# Patient Record
Sex: MALE | Race: WHITE | NOT HISPANIC OR LATINO | Employment: OTHER | URBAN - METROPOLITAN AREA
[De-identification: names, ages, dates, MRNs, and addresses within clinical notes are randomized per-mention and may not be internally consistent; named-entity substitution may affect disease eponyms.]

---

## 2017-11-10 RX ORDER — DABIGATRAN ETEXILATE 150 MG/1
150 CAPSULE ORAL 2 TIMES DAILY
Qty: 36 CAPSULE | Refills: 0 | COMMUNITY
Start: 2017-11-10 | End: 2018-01-10 | Stop reason: ALTCHOICE

## 2018-01-10 ENCOUNTER — OFFICE VISIT (OUTPATIENT)
Dept: CARDIOLOGY | Facility: CLINIC | Age: 68
End: 2018-01-10

## 2018-01-10 VITALS
DIASTOLIC BLOOD PRESSURE: 78 MMHG | HEART RATE: 76 BPM | SYSTOLIC BLOOD PRESSURE: 118 MMHG | HEIGHT: 70 IN | WEIGHT: 285 LBS | BODY MASS INDEX: 40.8 KG/M2

## 2018-01-10 DIAGNOSIS — R79.1 ELEVATED INTERNATIONAL NORMALIZED RATIO (INR): ICD-10-CM

## 2018-01-10 DIAGNOSIS — E78.5 HYPERLIPIDEMIA, UNSPECIFIED HYPERLIPIDEMIA TYPE: ICD-10-CM

## 2018-01-10 DIAGNOSIS — I48.20 CHRONIC ATRIAL FIBRILLATION (HCC): Primary | ICD-10-CM

## 2018-01-10 DIAGNOSIS — Z79.01 ANTICOAGULATED: ICD-10-CM

## 2018-01-10 PROCEDURE — 93000 ELECTROCARDIOGRAM COMPLETE: CPT | Performed by: INTERNAL MEDICINE

## 2018-01-10 PROCEDURE — 99214 OFFICE O/P EST MOD 30 MIN: CPT | Performed by: INTERNAL MEDICINE

## 2018-01-10 RX ORDER — ATORVASTATIN CALCIUM 10 MG/1
TABLET, FILM COATED ORAL
Refills: 2 | COMMUNITY
Start: 2017-11-09

## 2018-01-10 NOTE — PROGRESS NOTES
" Subjective:       Froilan Hill is a 67 y.o. male who here for follow up    CC  Follow up for afib  HPI  67 yr old here for follow up with afib, chronic anticoagulation, hyperlipidemia     Froilan Hill  here for follow up with no complaints of chest pain, sob, palpitation, syncope, near syncope  No side effects with current meds  No pnd, orthopnea       Problem List Items Addressed This Visit        Cardiovascular and Mediastinum    Atrial fibrillation - Primary    Hyperlipidemia    Relevant Medications    atorvastatin (LIPITOR) 10 MG tablet       Hematopoietic and Hemostatic    Elevated international normalized ratio (INR)    Anticoagulated        .    The following portions of the patient's history were reviewed and updated as appropriate: allergies, current medications, past family history, past medical history, past social history, past surgical history and problem list.    Past Medical History:   Diagnosis Date   • Aortic regurgitation    • Arrhythmia    • Atrial fibrillation    • CPAP (continuous positive airway pressure) dependence    • Depression    • Diabetes mellitus    • GERD (gastroesophageal reflux disease)    • Glaucoma    • Hyperlipidemia    • Lymphoma    • Obesity    • Pleural effusion    • Sleep apnea    • Stroke    • Thyroid disease     reports that he quit smoking about 31 years ago. His smoking use included Cigarettes. He has never used smokeless tobacco. He reports that he does not drink alcohol or use illicit drugs.  Family History   Problem Relation Age of Onset   • Arthritis Mother    • COPD Mother    • Diabetes Mother    • Depression Mother    • Skin cancer Mother    • Asthma Father        Review of Systems  Constitutional: No wt loss, fever, fatigue  Gastrointestinal: No nausea, abdominal pain  Behavioral/Psych: No insomnia or anxiety   Cardiovascular sob  Objective:       Physical Exam           Physical Exam  /78  Pulse 76  Ht 177.8 cm (70\")  Wt 129 kg (285 lb)  BMI 40.89 " kg/m2    General appearance: NAD, conversant   Eyes: anicteric sclerae, moist conjunctivae; no lid-lag; PERRLA   HENT: Atraumatic; oropharynx clear with moist mucous membranes and no mucosal ulcerations;  normal hard and soft palate   Neck: Trachea midline; FROM, supple, no thyromegaly or lymphadenopathy   Lungs: CTA, with normal respiratory effort and no intercostal retractions   CV: S1-S2 regular, no murmurs, no rub, no gallop   Abdomen: Soft, non-tender; no masses or HSM   Extremities: No peripheral edema or extremity lymphadenopathy  Skin: Normal temperature, turgor and texture; no rash, ulcers or subcutaneous nodules   Psych: Appropriate affect, alert and oriented to person, place and time           Cardiographics  @  ECG 12 Lead  Date/Time: 1/10/2018 9:57 AM  Performed by: KAILEY STYLES  Authorized by: KAILEY STYLES   Comparison: compared with previous ECG   Similar to previous ECG  Rhythm: atrial fibrillation  ST Flattening: all  Clinical impression: abnormal ECG            Echocardiogram:        Current Outpatient Prescriptions:   •  dabigatran etexilate (PRADAXA) 150 MG capsu, Take 1 capsule by mouth 2 (Two) Times a Day., Disp: 36 capsule, Rfl: 0  •  levothyroxine (SYNTHROID, LEVOTHROID) 200 MCG tablet, Take  by mouth Daily., Disp: , Rfl:   •  metFORMIN (GLUCOPHAGE) 1000 MG tablet, Take  by mouth 2 (Two) Times a Day., Disp: , Rfl:   •  sertraline (ZOLOFT) 100 MG tablet, Take 150 mg by mouth 2 (two) times a day., Disp: , Rfl:   •  atorvastatin (LIPITOR) 10 MG tablet, TAKE 1 TABLET BY MOUTH ONE TIME A DAY, Disp: , Rfl: 2   Assessment:        Patient Active Problem List   Diagnosis   • Atrial fibrillation   • Diabetes mellitus   • Hyperlipidemia   • Lymphoma of extranodal and solid organ sites   • Sleep apnea   • Pleural effusion   • Elevated international normalized ratio (INR)   • Pulmonary collapse   • Anticoagulated   • Shortness of breath               Plan:            ICD-10-CM ICD-9-CM    1. Chronic atrial fibrillation I48.2 427.31   2. Hyperlipidemia, unspecified hyperlipidemia type E78.5 272.4   3. Anticoagulated Z79.01 V58.61   4. Elevated international normalized ratio (INR) R79.1 790.92     1. Chronic atrial fibrillation  controlled    2. Hyperlipidemia, unspecified hyperlipidemia type  Risk of the hyperlipidemia, importance of the treatment has been explained    Pros and cons of the statins has been explained    Regular blood workup as well as side effects including the liver failure, myelopathy death has been explained          3. Anticoagulated  Pros and cons as well as indication of the anticoagulation has been explained to the patient in detail    There are no obvious complications at this stage    Risk of  the bleedings has been explained    Need for the regular blood workup and adjust the dose has been explained    Need for proper follow-up on anticoagulation also has been explained      4. Elevated international normalized ratio (INR)      CHANGE PRADAXA TO ELLOQUISE DUE TO COST    Pros and cons of this new medication / change medication has been explained to  the patient    Possible side effects has been explained    Associated need of the blood  Work has been explained    Need for the compliance of the medication has been explained      DOES NOT WANT STRESS TEST/ ECHO DUE TO COST    Pros and cons of ASA in primary and secondary prevention of CAD has been discussed.  Risks of bleeding and other possible side effects have been discussed, Diff advantages and disadvantages of 325 vs 81  Mg of ASA were discussed, at this stage it has been recommended to start ASA 81 mg /day     COUNSELING:    Froilan Capps was given to patient for the following topics: diagnostic results, risk factor reductions, impressions, risks and benefits of treatment options and importance of treatment compliance .       SMOKING COUNSELING:    Counseling given: Not Answered      EMR Dragon/Transcription  disclaimer:   Much of this encounter note is an electronic transcription/translation of spoken language to printed text. The electronic translation of spoken language may permit erroneous, or at times, nonsensical words or phrases to be inadvertently transcribed; Although I have reviewed the note for such errors, some may still exist.

## 2018-01-25 ENCOUNTER — TELEPHONE (OUTPATIENT)
Dept: CARDIOLOGY | Facility: CLINIC | Age: 68
End: 2018-01-25

## 2018-01-25 NOTE — TELEPHONE ENCOUNTER
Mr Sergio states that he was placed on Eliquis and his insurance needs to be called to request a tier adjustment. Please call insurance and then call mr bowser to inform what insurance states.  -ap

## 2019-01-01 ENCOUNTER — HOSPITAL ENCOUNTER (OUTPATIENT)
Dept: NUCLEAR MEDICINE | Facility: HOSPITAL | Age: 69
Discharge: HOME | End: 2019-04-26

## 2019-01-01 ENCOUNTER — APPOINTMENT (OUTPATIENT)
Dept: LAB | Facility: HOSPITAL | Age: 69
End: 2019-01-01

## 2019-01-01 ENCOUNTER — TELEPHONE (OUTPATIENT)
Dept: CARDIOLOGY | Facility: CLINIC | Age: 69
End: 2019-01-01

## 2019-01-01 ENCOUNTER — HOSPITAL ENCOUNTER (OUTPATIENT)
Dept: CARDIOLOGY | Facility: HOSPITAL | Age: 69
Discharge: HOME | End: 2019-04-26
Admitting: NURSE PRACTITIONER

## 2019-01-01 ENCOUNTER — OFFICE VISIT (OUTPATIENT)
Dept: CARDIOLOGY | Facility: CLINIC | Age: 69
End: 2019-01-01

## 2019-01-01 VITALS
SYSTOLIC BLOOD PRESSURE: 110 MMHG | WEIGHT: 273 LBS | HEART RATE: 90 BPM | BODY MASS INDEX: 40.43 KG/M2 | DIASTOLIC BLOOD PRESSURE: 68 MMHG | HEIGHT: 69 IN

## 2019-01-01 VITALS
WEIGHT: 271 LBS | DIASTOLIC BLOOD PRESSURE: 61 MMHG | HEART RATE: 96 BPM | HEIGHT: 70 IN | SYSTOLIC BLOOD PRESSURE: 118 MMHG | BODY MASS INDEX: 38.8 KG/M2

## 2019-01-01 DIAGNOSIS — Z79.01 ANTICOAGULATED: ICD-10-CM

## 2019-01-01 DIAGNOSIS — R06.02 SHORTNESS OF BREATH: Primary | ICD-10-CM

## 2019-01-01 DIAGNOSIS — I48.20 CHRONIC ATRIAL FIBRILLATION (HCC): ICD-10-CM

## 2019-01-01 DIAGNOSIS — I48.91 ATRIAL FIBRILLATION, CONTROLLED (HCC): ICD-10-CM

## 2019-01-01 DIAGNOSIS — R06.02 SOB (SHORTNESS OF BREATH): Primary | ICD-10-CM

## 2019-01-01 DIAGNOSIS — R07.2 PRECORDIAL PAIN: ICD-10-CM

## 2019-01-01 LAB
ALBUMIN SERPL-MCNC: 4.1 G/DL (ref 3.5–5.2)
ALBUMIN/GLOB SERPL: 1.3 G/DL
ALP SERPL-CCNC: 76 U/L (ref 39–117)
ALT SERPL W P-5'-P-CCNC: 17 U/L (ref 1–41)
ANION GAP SERPL CALCULATED.3IONS-SCNC: 13.7 MMOL/L
AORTIC DIMENSIONLESS INDEX: 0.6 (DI)
AST SERPL-CCNC: 19 U/L (ref 1–40)
BH CV ECHO MEAS - AO MAX PG (FULL): 3.7 MMHG
BH CV ECHO MEAS - AO MAX PG: 6 MMHG
BH CV ECHO MEAS - AO MEAN PG (FULL): 2 MMHG
BH CV ECHO MEAS - AO MEAN PG: 3 MMHG
BH CV ECHO MEAS - AO ROOT AREA (BSA CORRECTED): 1.3
BH CV ECHO MEAS - AO ROOT AREA: 7.1 CM^2
BH CV ECHO MEAS - AO ROOT DIAM: 3 CM
BH CV ECHO MEAS - AO V2 MAX: 122 CM/SEC
BH CV ECHO MEAS - AO V2 MEAN: 85 CM/SEC
BH CV ECHO MEAS - AO V2 VTI: 22.4 CM
BH CV ECHO MEAS - AVA(I,A): 1.8 CM^2
BH CV ECHO MEAS - AVA(I,D): 1.8 CM^2
BH CV ECHO MEAS - AVA(V,A): 1.9 CM^2
BH CV ECHO MEAS - AVA(V,D): 1.9 CM^2
BH CV ECHO MEAS - BSA(HAYCOCK): 2.5 M^2
BH CV ECHO MEAS - BSA: 2.4 M^2
BH CV ECHO MEAS - BZI_BMI: 40.3 KILOGRAMS/M^2
BH CV ECHO MEAS - BZI_METRIC_HEIGHT: 175.3 CM
BH CV ECHO MEAS - BZI_METRIC_WEIGHT: 123.8 KG
BH CV ECHO MEAS - EDV(CUBED): 216 ML
BH CV ECHO MEAS - EDV(MOD-SP2): 154 ML
BH CV ECHO MEAS - EDV(MOD-SP4): 119 ML
BH CV ECHO MEAS - EDV(TEICH): 180 ML
BH CV ECHO MEAS - EF(CUBED): 45.5 %
BH CV ECHO MEAS - EF(MOD-BP): 65 %
BH CV ECHO MEAS - EF(MOD-SP2): 70.1 %
BH CV ECHO MEAS - EF(MOD-SP4): 55.5 %
BH CV ECHO MEAS - EF(TEICH): 37.3 %
BH CV ECHO MEAS - ESV(CUBED): 117.6 ML
BH CV ECHO MEAS - ESV(MOD-SP2): 46 ML
BH CV ECHO MEAS - ESV(MOD-SP4): 53 ML
BH CV ECHO MEAS - ESV(TEICH): 112.8 ML
BH CV ECHO MEAS - FS: 18.3 %
BH CV ECHO MEAS - IVS/LVPW: 0.93
BH CV ECHO MEAS - IVSD: 0.9 CM
BH CV ECHO MEAS - LAT PEAK E' VEL: 13 CM/SEC
BH CV ECHO MEAS - LV DIASTOLIC VOL/BSA (35-75): 50.5 ML/M^2
BH CV ECHO MEAS - LV MASS(C)D: 225.9 GRAMS
BH CV ECHO MEAS - LV MASS(C)DI: 95.8 GRAMS/M^2
BH CV ECHO MEAS - LV MAX PG: 2.3 MMHG
BH CV ECHO MEAS - LV MEAN PG: 1 MMHG
BH CV ECHO MEAS - LV SYSTOLIC VOL/BSA (12-30): 22.5 ML/M^2
BH CV ECHO MEAS - LV V1 MAX: 75.1 CM/SEC
BH CV ECHO MEAS - LV V1 MEAN: 44.8 CM/SEC
BH CV ECHO MEAS - LV V1 VTI: 12.7 CM
BH CV ECHO MEAS - LVIDD: 6 CM
BH CV ECHO MEAS - LVIDS: 4.9 CM
BH CV ECHO MEAS - LVLD AP2: 8.2 CM
BH CV ECHO MEAS - LVLD AP4: 7.5 CM
BH CV ECHO MEAS - LVLS AP2: 6.3 CM
BH CV ECHO MEAS - LVLS AP4: 6.5 CM
BH CV ECHO MEAS - LVOT AREA (M): 3.1 CM^2
BH CV ECHO MEAS - LVOT AREA: 3.1 CM^2
BH CV ECHO MEAS - LVOT DIAM: 2 CM
BH CV ECHO MEAS - LVPWD: 0.97 CM
BH CV ECHO MEAS - MED PEAK E' VEL: 11 CM/SEC
BH CV ECHO MEAS - MV DEC SLOPE: 414 CM/SEC^2
BH CV ECHO MEAS - MV DEC TIME: 222 SEC
BH CV ECHO MEAS - MV E MAX VEL: 85.3 CM/SEC
BH CV ECHO MEAS - MV P1/2T MAX VEL: 80.2 CM/SEC
BH CV ECHO MEAS - MV P1/2T: 56.7 MSEC
BH CV ECHO MEAS - MVA P1/2T LCG: 2.7 CM^2
BH CV ECHO MEAS - MVA(P1/2T): 3.9 CM^2
BH CV ECHO MEAS - PA ACC TIME: 0.08 SEC
BH CV ECHO MEAS - PA MAX PG (FULL): 1.1 MMHG
BH CV ECHO MEAS - PA MAX PG: 2.4 MMHG
BH CV ECHO MEAS - PA PR(ACCEL): 44.8 MMHG
BH CV ECHO MEAS - PA V2 MAX: 77.1 CM/SEC
BH CV ECHO MEAS - RAP SYSTOLE: 3 MMHG
BH CV ECHO MEAS - RV MAX PG: 1.2 MMHG
BH CV ECHO MEAS - RV MEAN PG: 0.5 MMHG
BH CV ECHO MEAS - RV V1 MAX: 55.5 CM/SEC
BH CV ECHO MEAS - RV V1 MEAN: 38 CM/SEC
BH CV ECHO MEAS - RV V1 VTI: 9.8 CM
BH CV ECHO MEAS - SI(AO): 67.2 ML/M^2
BH CV ECHO MEAS - SI(CUBED): 41.7 ML/M^2
BH CV ECHO MEAS - SI(LVOT): 16.9 ML/M^2
BH CV ECHO MEAS - SI(MOD-SP2): 45.8 ML/M^2
BH CV ECHO MEAS - SI(MOD-SP4): 28 ML/M^2
BH CV ECHO MEAS - SI(TEICH): 28.5 ML/M^2
BH CV ECHO MEAS - SV(AO): 158.3 ML
BH CV ECHO MEAS - SV(CUBED): 98.4 ML
BH CV ECHO MEAS - SV(LVOT): 39.9 ML
BH CV ECHO MEAS - SV(MOD-SP2): 108 ML
BH CV ECHO MEAS - SV(MOD-SP4): 66 ML
BH CV ECHO MEAS - SV(TEICH): 67.2 ML
BH CV ECHO MEAS - TAPSE (>1.6): 1.1 CM2
BH CV ECHO MEAS - TR MAX VEL: 250 CM/SEC
BH CV ECHO MEASUREMENTS AVERAGE E/E' RATIO: 7.11
BH CV NUCLEAR PRIOR STUDY: 2
BH CV STRESS COMMENTS STAGE 1: NORMAL
BH CV STRESS DOSE REGADENOSON STAGE 1: 0.4
BH CV STRESS DURATION MIN STAGE 1: 0
BH CV STRESS DURATION SEC STAGE 1: 10
BH CV STRESS PROTOCOL 1: NORMAL
BH CV STRESS RECOVERY BP: NORMAL MMHG
BH CV STRESS RECOVERY HR: 102 BPM
BH CV STRESS STAGE 1: 1
BH CV VAS BP RIGHT ARM: NORMAL MMHG
BH CV XLRA - RV BASE: 3.5 CM
BH CV XLRA - TDI S': 7.3 CM/SEC
BILIRUB SERPL-MCNC: 0.5 MG/DL (ref 0.2–1.2)
BUN BLD-MCNC: 22 MG/DL (ref 8–23)
BUN/CREAT SERPL: 11.8 (ref 7–25)
CALCIUM SPEC-SCNC: 9.6 MG/DL (ref 8.6–10.5)
CHLORIDE SERPL-SCNC: 103 MMOL/L (ref 98–107)
CHOLEST SERPL-MCNC: 137 MG/DL (ref 0–200)
CO2 SERPL-SCNC: 24.3 MMOL/L (ref 22–29)
CREAT BLD-MCNC: 1.86 MG/DL (ref 0.76–1.27)
GFR SERPL CREATININE-BSD FRML MDRD: 36 ML/MIN/1.73
GLOBULIN UR ELPH-MCNC: 3.1 GM/DL
GLUCOSE BLD-MCNC: 193 MG/DL (ref 65–99)
HDLC SERPL-MCNC: 40 MG/DL (ref 40–60)
LDLC SERPL CALC-MCNC: 63 MG/DL (ref 0–100)
LDLC/HDLC SERPL: 1.57 {RATIO}
LEFT ATRIUM VOLUME INDEX: 45 ML/M2
LV EF NUC BP: 60 %
MAXIMAL PREDICTED HEART RATE: 151 BPM
MAXIMAL PREDICTED HEART RATE: 151 BPM
PERCENT MAX PREDICTED HR: 78.15 %
POTASSIUM BLD-SCNC: 4.9 MMOL/L (ref 3.5–5.2)
PROT SERPL-MCNC: 7.2 G/DL (ref 6–8.5)
SODIUM BLD-SCNC: 141 MMOL/L (ref 136–145)
STRESS BASELINE BP: NORMAL MMHG
STRESS BASELINE HR: 95 BPM
STRESS PERCENT HR: 92 %
STRESS POST PEAK BP: NORMAL MMHG
STRESS POST PEAK HR: 118 BPM
STRESS TARGET HR: 128 BPM
STRESS TARGET HR: 128 BPM
TRIGL SERPL-MCNC: 171 MG/DL (ref 0–150)
VLDLC SERPL-MCNC: 34.2 MG/DL (ref 5–40)

## 2019-01-01 PROCEDURE — 93306 TTE W/DOPPLER COMPLETE: CPT | Performed by: INTERNAL MEDICINE

## 2019-01-01 PROCEDURE — A9500 TC99M SESTAMIBI: HCPCS | Performed by: NURSE PRACTITIONER

## 2019-01-01 PROCEDURE — 93000 ELECTROCARDIOGRAM COMPLETE: CPT | Performed by: NURSE PRACTITIONER

## 2019-01-01 PROCEDURE — 80061 LIPID PANEL: CPT | Performed by: NURSE PRACTITIONER

## 2019-01-01 PROCEDURE — 99213 OFFICE O/P EST LOW 20 MIN: CPT | Performed by: NURSE PRACTITIONER

## 2019-01-01 PROCEDURE — 80053 COMPREHEN METABOLIC PANEL: CPT | Performed by: NURSE PRACTITIONER

## 2019-01-01 PROCEDURE — 99213 OFFICE O/P EST LOW 20 MIN: CPT | Performed by: INTERNAL MEDICINE

## 2019-01-01 PROCEDURE — 0 TECHNETIUM SESTAMIBI: Performed by: NURSE PRACTITIONER

## 2019-01-01 PROCEDURE — 93018 CV STRESS TEST I&R ONLY: CPT | Performed by: INTERNAL MEDICINE

## 2019-01-01 PROCEDURE — 93306 TTE W/DOPPLER COMPLETE: CPT

## 2019-01-01 PROCEDURE — 78452 HT MUSCLE IMAGE SPECT MULT: CPT | Performed by: INTERNAL MEDICINE

## 2019-01-01 PROCEDURE — 78452 HT MUSCLE IMAGE SPECT MULT: CPT

## 2019-01-01 PROCEDURE — 93017 CV STRESS TEST TRACING ONLY: CPT

## 2019-01-01 PROCEDURE — 25010000002 PERFLUTREN (DEFINITY) 8.476 MG IN SODIUM CHLORIDE 0.9 % 10 ML INJECTION: Performed by: NURSE PRACTITIONER

## 2019-01-01 PROCEDURE — 93016 CV STRESS TEST SUPVJ ONLY: CPT | Performed by: INTERNAL MEDICINE

## 2019-01-01 PROCEDURE — 25010000002 REGADENOSON 0.4 MG/5ML SOLUTION: Performed by: NURSE PRACTITIONER

## 2019-01-01 RX ORDER — OMEPRAZOLE 40 MG/1
40 CAPSULE, DELAYED RELEASE ORAL DAILY
COMMUNITY

## 2019-01-01 RX ORDER — LISINOPRIL 2.5 MG/1
2.5 TABLET ORAL DAILY
Refills: 1 | COMMUNITY
Start: 2019-01-01

## 2019-01-01 RX ORDER — ROPINIROLE 0.5 MG/1
0.5 TABLET, FILM COATED ORAL NIGHTLY
COMMUNITY

## 2019-01-01 RX ORDER — GLIMEPIRIDE 4 MG/1
4 TABLET ORAL DAILY
COMMUNITY
Start: 2019-01-01

## 2019-01-01 RX ADMIN — REGADENOSON 0.4 MG: 0.08 INJECTION, SOLUTION INTRAVENOUS at 09:05

## 2019-01-01 RX ADMIN — TECHNETIUM TC 99M SESTAMIBI 1 DOSE: 1 INJECTION INTRAVENOUS at 09:05

## 2019-01-01 RX ADMIN — TECHNETIUM TC 99M SESTAMIBI 1 DOSE: 1 INJECTION INTRAVENOUS at 08:01

## 2019-01-01 RX ADMIN — SODIUM CHLORIDE 3 ML: 9 INJECTION INTRAMUSCULAR; INTRAVENOUS; SUBCUTANEOUS at 12:15

## 2019-04-23 NOTE — PROGRESS NOTES
Patient Name: Froilan Hill Jr  :1950  Age: 68 y.o.  Primary Cardiologist: Julio Ng MD  Encounter Provider:  MICHELE Evans      Chief Complaint:   Chief Complaint   Patient presents with   • Surgical Clearance         HPI this is a 68-year-old male, new to this provider, who comes to us in follow-up today for atrial fibrillation, hyperlipidemia, chronic anticoagulation.  The patient is anticoagulated on Eliquis for his atrial fibrillation, and today in office is atrial fibrillation, rate controlled in the 90s with ventricular couplet noted on ECG today.  Patient is currently on atorvastatin low-dose.  Last lipid panel done not on profile.  Echo and stress test were ordered in 2016, these were not performed.  Patient is currently diaphoretic, which he believes is due to recent hypoglycemia.  He complains of dyspnea on exertion particularly when climbing stairs or at work, but denies chest pain, dizziness, weakness, syncope, or near syncope.  Patient also complains of palpitations and fatigue more than normal.  He is to undergo EGD and colonoscopy soon, and is here today for clearance.    Patient Active Problem List   Diagnosis   • Atrial fibrillation (CMS/HCC)   • Diabetes mellitus (CMS/HCC)   • Hyperlipidemia   • Lymphoma of extranodal and solid organ sites (CMS/HCC)   • Sleep apnea   • Pleural effusion   • Elevated international normalized ratio (INR)   • Pulmonary collapse   • Anticoagulated   • Shortness of breath           The following portions of the patient's history were reviewed and updated as appropriate: allergies, current medications, past family history, past medical history, past social history, past surgical history and problem list.    Current Outpatient Medications on File Prior to Visit   Medication Sig Dispense Refill   • apixaban (ELIQUIS) 5 MG tablet tablet Take 1 tablet by mouth Every 12 (Twelve) Hours. 180 tablet 3   • atorvastatin (LIPITOR) 10 MG tablet TAKE 1  TABLET BY MOUTH ONE TIME A DAY  2   • glimepiride (AMARYL) 4 MG tablet Take 4 mg by mouth Daily.     • Insulin Degludec (TRESIBA FLEXTOUCH) 200 UNIT/ML solution pen-injector Tresiba FlexTouch U-200 insulin 200 unit/mL (3 mL) subcutaneous pen     • levothyroxine (SYNTHROID, LEVOTHROID) 300 MCG tablet Take 300 mcg by mouth Daily.     • lisinopril (PRINIVIL,ZESTRIL) 2.5 MG tablet Take 2.5 mg by mouth Daily.  1   • metFORMIN (GLUCOPHAGE) 1000 MG tablet Take 1,000 mg by mouth Daily With Breakfast.     • omeprazole (priLOSEC) 40 MG capsule Take 40 mg by mouth Daily.     • rOPINIRole (REQUIP) 0.5 MG tablet Take 0.5 mg by mouth Every Night.     • sertraline (ZOLOFT) 100 MG tablet Take 150 mg by mouth 2 (two) times a day.       No current facility-administered medications on file prior to visit.        Past Medical History:   Diagnosis Date   • Aortic regurgitation    • Arrhythmia    • Atrial fibrillation (CMS/HCC)    • CPAP (continuous positive airway pressure) dependence    • Depression    • Diabetes mellitus (CMS/HCC)    • GERD (gastroesophageal reflux disease)    • Glaucoma    • Hyperlipidemia    • Lymphoma (CMS/HCC)    • Obesity    • Pleural effusion    • Sleep apnea    • Stroke (CMS/HCC)    • Thyroid disease        Past Surgical History:   Procedure Laterality Date   • CARDIOVERSION     • COLONOSCOPY     • ENDOSCOPY     • HERNIA REPAIR     • PARTIAL GASTRECTOMY     • TONSILLECTOMY     • TUMOR EXCISION         Family History   Problem Relation Age of Onset   • Arthritis Mother    • COPD Mother    • Diabetes Mother    • Depression Mother    • Skin cancer Mother    • Asthma Father        Social History     Socioeconomic History   • Marital status:      Spouse name: Not on file   • Number of children: Not on file   • Years of education: Not on file   • Highest education level: Not on file   Tobacco Use   • Smoking status: Former Smoker     Types: Cigarettes     Last attempt to quit: 10/3/1986     Years since  "quittin.5   • Smokeless tobacco: Never Used   Substance and Sexual Activity   • Alcohol use: No   • Drug use: No   • Sexual activity: Defer       Review of Systems   Constitution: Positive for diaphoresis and malaise/fatigue. Negative for weakness.   HENT: Negative for nosebleeds and stridor.    Cardiovascular: Positive for dyspnea on exertion. Negative for chest pain, claudication, irregular heartbeat, leg swelling, near-syncope, orthopnea, palpitations, paroxysmal nocturnal dyspnea and syncope.   Respiratory: Negative for cough, hemoptysis, shortness of breath and wheezing.    Gastrointestinal: Negative for abdominal pain, constipation, diarrhea, hematemesis, hematochezia, melena, nausea and vomiting.   Neurological: Negative for dizziness, focal weakness and light-headedness.       OBJECTIVE:   Vital Signs  Vitals:    19 1200   BP: 110/68   Pulse: 90     Estimated body mass index is 40.32 kg/m² as calculated from the following:    Height as of this encounter: 175.3 cm (69\").    Weight as of this encounter: 124 kg (273 lb).    Physical Exam   Constitutional: He is oriented to person, place, and time. He appears well-developed and well-nourished. No distress.   HENT:   Head: Normocephalic and atraumatic.   Eyes: Conjunctivae and EOM are normal. Pupils are equal, round, and reactive to light.   Neck: Normal range of motion. Neck supple. No JVD present. No tracheal deviation present. No thyromegaly present.   Cardiovascular: Normal rate, normal heart sounds and intact distal pulses. Exam reveals no gallop and no friction rub.   No murmur heard.  Pulmonary/Chest: Effort normal and breath sounds normal. No respiratory distress. He has no wheezes. He has no rales. He exhibits no tenderness.   Abdominal: Soft. Bowel sounds are normal. He exhibits no distension. There is no tenderness.   Musculoskeletal: Normal range of motion. He exhibits no edema, tenderness or deformity.   Neurological: He is alert and " oriented to person, place, and time.   Skin: Skin is warm. No rash noted. He is diaphoretic. No erythema. No pallor.   Diaphoretic, patient suspects d/t recent hypoglycemia   Psychiatric: He has a normal mood and affect. His behavior is normal.         ECG 12 Lead  Date/Time: 4/23/2019 12:14 PM  Performed by: Jayashree Delong APRN  Authorized by: Jayashree Delong APRN   Comparison: compared with previous ECG from 1/10/2018  Rhythm: atrial fibrillation  Ectopy: multifocal PVCs and couplets  Rate: normal                  ASSESSMENT:      Diagnosis Plan   1. Atrial fibrillation, controlled (CMS/[facility])  ECG 12 Lead         PLAN OF CARE:     1.  Atrial fibrillation-patient is anticoagulated on Eliquis.  No signs or symptoms of bleeding at this time.  On ECG today the patient is in atrial fibrillation, rate controlled in the 90s, with ventricular couplet noted.  No echo or stress test on profile.  Will need to obtain in order to be able to clear patient for surgery.    2.  Hyperlipidemia-no lipid panel on profile.  Patient is currently on statin therapy.  Will obtain lipid panel and LFTs.    3.  Anticoagulation-on Eliquis for atrial fibrillation.  No signs or symptoms of bleeding at this time.    4.  Surgical clearance-patient undergo EGD and colonoscopy soon, given his dyspnea on exertion and fatigue will need to obtain echo and stress test in order to be able to clear him for this.  Would have him undergo these tests and follow-up with Dr. Ng in 4 weeks or sooner if needed.    5.  Shortness of breath- dyspnea on exertion noted, most notably when climbing stairs and working.  Patient denies chest pain.  Will obtain echo and stress test.          Sincerely,   MICHELE Evans  [facility]  04/23/19  12:24 PM